# Patient Record
Sex: MALE | Race: WHITE | NOT HISPANIC OR LATINO | URBAN - METROPOLITAN AREA
[De-identification: names, ages, dates, MRNs, and addresses within clinical notes are randomized per-mention and may not be internally consistent; named-entity substitution may affect disease eponyms.]

---

## 2019-09-04 ENCOUNTER — EMERGENCY (EMERGENCY)
Facility: HOSPITAL | Age: 24
LOS: 1 days | Discharge: HOME | End: 2019-09-04
Attending: EMERGENCY MEDICINE | Admitting: EMERGENCY MEDICINE
Payer: MEDICAID

## 2019-09-04 VITALS
DIASTOLIC BLOOD PRESSURE: 68 MMHG | SYSTOLIC BLOOD PRESSURE: 128 MMHG | TEMPERATURE: 98 F | RESPIRATION RATE: 18 BRPM | OXYGEN SATURATION: 98 % | HEART RATE: 85 BPM

## 2019-09-04 PROCEDURE — 99282 EMERGENCY DEPT VISIT SF MDM: CPT

## 2019-09-04 NOTE — ED PROVIDER NOTE - OBJECTIVE STATEMENT
24 y/o M with no significant PMHx presents to ED with rash to bilateral feet since 5 months. Pt states that rash began shortly after receiving a tick bite to L calf in [place]. Pt notes that the tick was removed hours after without any difficulty. Rash is improving now and is intermittent. Pt has been using hydrogen peroxide, A&D ointment, and Cortisone cream with mild relief of symptoms. Associated with these symptoms pt has itching to these areas. Pt denies having any purulent drainage, fever, or history of eczema. 24 y/o M with no significant PMHx presents to ED with rash to bilateral feet since 5 months. Pt states that rash began shortly after receiving a tick bite to L calf in [place]. Pt notes that the tick was removed less than 12 hours  after without any difficulty. Rash is improving now and is intermittent. Pt has been using hydrogen peroxide, A&D ointment, and Cortisone cream with mild relief of symptoms. Associated with these symptoms pt has itching to these areas. Pt denies having any purulent drainage, fever, joint pain, dyspnea or history of eczema.

## 2019-09-04 NOTE — ED PROVIDER NOTE - CLINICAL SUMMARY MEDICAL DECISION MAKING FREE TEXT BOX
22 y/o M presents with likely allergic reaction vs eczema, no sign of bacterial infection. Less likely fungal rash. Plan to continue cortisone cream and follow up with podiatry. 24 y/o M presents with likely allergic dermatitis vs eczema, no sign of bacterial infection. do not suspect Lyme. Less likely fungal rash. Plan to continue cortisone cream and follow up with podiatry.

## 2019-09-04 NOTE — ED PROVIDER NOTE - NSFOLLOWUPCLINICS_GEN_ALL_ED_FT
[facility]  Podiatry  [address] [place] [address] - [address] [place] [address]  Phone: [phone]  Fax:   Follow Up Time: 7-10 Days

## 2019-09-04 NOTE — ED PROVIDER NOTE - PHYSICAL EXAMINATION
Skin: Scaly mildly erythematous rash to dorsum of bilateral feet, some excoriations present, no tenderness, no crepitus , no fluctuance or purulent drainage, no lesion on soles of feet

## 2019-09-04 NOTE — ED PROVIDER NOTE - PATIENT PORTAL LINK FT
You can access the FollowMyHealth Patient Portal offered by [facility] by registering at the following website: [email or web address]. By joining [employer]’s FollowMyHealth portal, you will also be able to view your health information using other applications (apps) compatible with our system.

## 2019-09-04 NOTE — ED PROVIDER NOTE - NS_ ATTENDINGSCRIBEDETAILS _ED_A_ED_FT
The scribe's documentation has been prepared under my direction and personally reviewed by me, Penelope Isaac MD, in its entirety. I confirm that the note above accurately reflects all work, treatment, procedures, and medical decision making performed by me.

## 2019-09-04 NOTE — ED ADULT TRIAGE NOTE - CHIEF COMPLAINT QUOTE
p/t c/o of itchiness and discomfort to bilateral feet for few weeks, denies any other discomfort nad noted

## 2019-09-04 NOTE — ED PROVIDER NOTE - NSFOLLOWUPINSTRUCTIONS_ED_ALL_ED_FT
Please see  a dermatologist or a podiatrist for follow up. Continue applying cortisone cream and soothing lotion to moisturize. Come back if you have fever, pus, redness, pain.

## 2019-09-15 ENCOUNTER — EMERGENCY (EMERGENCY)
Facility: HOSPITAL | Age: 24
LOS: 1 days | Discharge: HOME | End: 2019-09-15
Attending: EMERGENCY MEDICINE | Admitting: EMERGENCY MEDICINE
Payer: MEDICAID

## 2019-09-15 VITALS
RESPIRATION RATE: 16 BRPM | HEART RATE: 79 BPM | SYSTOLIC BLOOD PRESSURE: 115 MMHG | DIASTOLIC BLOOD PRESSURE: 69 MMHG | OXYGEN SATURATION: 100 % | TEMPERATURE: 98 F

## 2019-09-15 PROBLEM — Z78.9 OTHER SPECIFIED HEALTH STATUS: Chronic | Status: ACTIVE | Noted: 2019-09-04

## 2019-09-15 PROCEDURE — 99283 EMERGENCY DEPT VISIT LOW MDM: CPT

## 2019-09-15 NOTE — ED PROVIDER NOTE - NSFOLLOWUPINSTRUCTIONS_ED_ALL_ED_FT
You were seen in the Emergency Department for evaluation of a rash on your feet. It looks like it is a fungal infection, such as Athlete's foot. Use the cream on your skin as prescribed and follow up with your PCP. Return to ED should rash rapidly worsen, spread to other areas, or skin starts to slough off. Also return if you get fever, chills that cannot be controlled with Tylenol/Motrin.

## 2019-09-15 NOTE — ED PROVIDER NOTE - OBJECTIVE STATEMENT
23 year old male with a pmhx of asthma, presents to ED for evaluation of foot rash. Patient reports the rash started about 5-6 months ago and has acutely worsened in the past few days. It is located over the dorsum of the feet and is itchy. He has tried steroid cream and neosporin without relief. Patient was seen in the ED for the same rash 10 days ago. 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 Of note, triage assessment said left rib pain, patient however was confused and states he does not have rib pain at this time. No fever, chills, sob, cp, abd pain, or urinary issues.

## 2019-09-15 NOTE — ED PROVIDER NOTE - CLINICAL SUMMARY MEDICAL DECISION MAKING FREE TEXT BOX
23 year old male with a pmhx of asthma, presents to ED for 2nd time for evaluation of rash at dorsum of b/l feet. Pt has been treating it with steroid cream and Neosporin without relief. Rash is consistent with Athlete's foot. Will prescribe Ketoconazole and advise him to stop the other creams. 23 year old male with a pmhx of asthma, presents to ED for 2nd time for evaluation of rash at dorsum of b/l feet. Pt has been treating it with steroid cream and Neosporin without relief. Rash is consistent with Athlete's foot. Will prescribe antifungal and advise him to stop the other creams.    See "Attending Attestation" for the attending's MDM.

## 2019-09-15 NOTE — ED PROVIDER NOTE - PATIENT PORTAL LINK FT
You can access the FollowMyHealth Patient Portal offered by [facility] by registering at the following website: [email or web address]. By joining [employer]’s FollowMyHealth portal, you will also be able to view your health information using other applications (apps) compatible with our system.

## 2019-09-15 NOTE — ED PROVIDER NOTE - ATTENDING CONTRIBUTION TO CARE
DR. FARAH, ATTENDING MD-  I have reviewed and discussed the medical student’s documentation and findings with the student. After personally examining the patient, my findings have been added to this documentation.    24 y/o male with b/l feet rash.  Triage note states cc is left rib pain, however, pt states that the rib pain is chronic, not an issue today.  He would like to be evaluated for pruritic feet rash of six months.  Has been applying abx ointment and steroid cream without improvement.  Wears tight shoes for long hours at work.  Denies f/c, ha, neck stiffness, cp, sob, cough, abd pain, n/v/d, dysuria.  Afebrile, vs wnl, nad, ctabil, s1s2 rrr no m/r/g, abd soft non ttp no r/g, no cva tenderness b/l, no leg swelling b/l, b/l feet with hyperkeratosis and scaling over dorsal aspect with minimal overlying erythema, no dc, no fluctuance or induration noted.  Exam c/w tinea pedis.  Will rx antifungal, pt to f/u with pcp.

## 2019-09-15 NOTE — ED ADULT TRIAGE NOTE - CHIEF COMPLAINT QUOTE
pt amb to triage c/o L sided rib pain since this morning, states "just woke up with it" no resp distress noted, able to complete sentences, denies falls/trauma to area

## 2019-09-15 NOTE — ED PROVIDER NOTE - SKIN, MLM
hyperkeratosis over dorsum of b/l feet. excoriations consistent with scratching. with minimal overlying erythema, no discharge, no fluctuance, or induration
